# Patient Record
Sex: MALE | Race: WHITE
[De-identification: names, ages, dates, MRNs, and addresses within clinical notes are randomized per-mention and may not be internally consistent; named-entity substitution may affect disease eponyms.]

---

## 2018-10-15 ENCOUNTER — HOSPITAL ENCOUNTER (OUTPATIENT)
Dept: HOSPITAL 59 - SUR | Age: 58
Discharge: HOME | End: 2018-10-15
Attending: SURGERY
Payer: COMMERCIAL

## 2018-10-15 DIAGNOSIS — I10: ICD-10-CM

## 2018-10-15 DIAGNOSIS — L72.0: Primary | ICD-10-CM

## 2018-10-15 PROCEDURE — 11423 EXC H-F-NK-SP B9+MARG 2.1-3: CPT

## 2018-10-15 PROCEDURE — 00300 ANES ALL PX INTEG H/N/PTRUNK: CPT

## 2018-10-16 NOTE — OPERATIVE NOTE
DATE OF SURGERY: 10/15/2018



Surgeon: Wilfredo Martin DO



PREOPERATIVE DIAGNOSIS: Facial mass. 



POSTOPERATIVE DIAGNOSIS: Facial mass. 



OPERATION: Wide excision of facial mass measuring 2 x 2 cm in the subcu. 



Indication: The patient is a 58-year-old male who has had a mass on his cheek. We discussed 
excision. Risks, benefits, and alternatives were discussed. Risks include bleeding, infection, 
recurrence, injury to underlying facial nerve. He understood this fully. Thereafter, consent was 
signed and questions answered. 



PROCEDURE: The patient was taken to the operating room and placed in a supine position. Local IV 
sedation was given per the department of anesthesia. The patient's face was prepped and draped in 
the usual fashion. The area around the mass was anesthetized with a total of 8 mL of 0.25% 
Sensorcaine with epinephrine. A 2.5 cm incision was made. This was carried down to the capsule of a 
sebaceous cyst. This was dissected free from surrounding tissue with sharp dissection as well as 
cautery. The wound was then irrigated and closed with 4-0 nylon. He was taken to the recovery room 
in satisfactory condition. 



FINDINGS AT THE TIME OF SURGERY: A 2 cm mass in the subcu resected as above. CC: NOELLE MONTEMAYOR MD, 
FACP

[facility]